# Patient Record
Sex: FEMALE | URBAN - METROPOLITAN AREA
[De-identification: names, ages, dates, MRNs, and addresses within clinical notes are randomized per-mention and may not be internally consistent; named-entity substitution may affect disease eponyms.]

---

## 2023-04-06 ENCOUNTER — OFFICE VISIT (OUTPATIENT)
Facility: LOCATION | Age: 78
End: 2023-04-06
Payer: MEDICARE

## 2023-04-06 DIAGNOSIS — H04.123 DRY EYE SYNDROME OF BILATERAL LACRIMAL GLANDS: ICD-10-CM

## 2023-04-06 DIAGNOSIS — H25.813 COMBINED FORMS OF AGE-RELATED CATARACT, BILATERAL: ICD-10-CM

## 2023-04-06 DIAGNOSIS — H40.1132 PRIMARY OPEN-ANGLE GLAUCOMA BILATERAL MODERATE STAGE: Primary | ICD-10-CM

## 2023-04-06 DIAGNOSIS — H11.052 PERIPHERAL PTERYGIUM, PROGRESSIVE, LEFT EYE: ICD-10-CM

## 2023-04-06 PROCEDURE — 99215 OFFICE O/P EST HI 40 MIN: CPT | Performed by: OPHTHALMOLOGY

## 2023-04-06 PROCEDURE — 92083 EXTENDED VISUAL FIELD XM: CPT | Performed by: OPHTHALMOLOGY

## 2023-04-06 ASSESSMENT — INTRAOCULAR PRESSURE
OD: 17
OD: 16
OS: 17
OS: 14

## 2023-04-06 NOTE — IMPRESSION/PLAN
Impression: Examination revealed cataract. Clinically significant Plan: Plan for CEIOL/OMNI/Hydrus OD then OS when patient is motivated to proceed. Target distance OU Consideration to astigmatism correction if significant on cataract testing.

## 2023-04-06 NOTE — IMPRESSION/PLAN
Impression: Nasal PTG 2.4mm into cornea Plan: Advised sun protection and artificial tears prn discomfort.

## 2023-04-06 NOTE — IMPRESSION/PLAN
Impression: Examination revealed dry eye syndrome secondary to tear deficiencies. Discussed with patient significant dry eye and PTG OS.  Plan: Increase ATs to TID OU

## 2023-04-06 NOTE — IMPRESSION/PLAN
Impression: 5 week f/u with VF and IOP check since restarting glaucoma gtts for POAG OU. POHx: POAG moderate OU, Cataract OU, AIMEE, (-) trauma. FOHx: (-) for ocular disease. PMHx: Depression. Eye medications: None. Was supposed to be on Latanoprost and Timolol OU. Good response to Latanoprost 19/20->16/14. TMAX: 24/24. Target IOP: Pending. Plan: Testing: OCT [facility] [place] 3/2023: OD thin I/S, OS thin S>I. Possible progression OU. HVF 24-2 4/2023: OU unreliable, OD SAS>IAS, OS superior altitudinal. OU unable to assess for progression. Pachy: 519/756 Gonio 3/2023: CBB, 4+ pigment 360 OD, SS, 4+ pigment 360 OS. Today:
IOP acceptable OU. Patient reports moderate compliance with medications. Patient reports will be setting alarms to better compliance. HVF performed and reviewed. Informed patient stable examination findings. Stressed importance of good medication compliance to prevent further progression to glaucoma. Plan:
Continue Timolol QAM OU. Continue Latanoprost QHS OU. RTC in 3 months with OCT/ONH OU. Next step CEIOL/OMNI/Hydrus OD then OS. Target distance OU.

## 2023-07-21 ENCOUNTER — OFFICE VISIT (OUTPATIENT)
Facility: LOCATION | Age: 78
End: 2023-07-21
Payer: MEDICARE

## 2023-07-21 DIAGNOSIS — H04.123 DRY EYE SYNDROME OF BILATERAL LACRIMAL GLANDS: ICD-10-CM

## 2023-07-21 DIAGNOSIS — H25.813 COMBINED FORMS OF AGE-RELATED CATARACT, BILATERAL: ICD-10-CM

## 2023-07-21 DIAGNOSIS — H11.052 PERIPHERAL PTERYGIUM, PROGRESSIVE, LEFT EYE: ICD-10-CM

## 2023-07-21 DIAGNOSIS — H40.1132 PRIMARY OPEN-ANGLE GLAUCOMA BILATERAL MODERATE STAGE: Primary | ICD-10-CM

## 2023-07-21 PROCEDURE — 99214 OFFICE O/P EST MOD 30 MIN: CPT | Performed by: OPHTHALMOLOGY

## 2023-07-21 PROCEDURE — 92133 CPTRZD OPH DX IMG PST SGM ON: CPT | Performed by: OPHTHALMOLOGY

## 2023-07-21 ASSESSMENT — INTRAOCULAR PRESSURE
OS: 16
OD: 16

## 2023-07-21 NOTE — IMPRESSION/PLAN
Impression: Discussed with patient significant dry eye and ptg  OS Plan: Today:
Patient c/o discomfort OS>OD. Informed patient of mild dryness and eyelid inflammation are the cause for discomfort. Recommend patient to improve dryness prior to proceeding with cataract surgery. Plan:
Start ATs QID OU. Start Hot Compresses BID OU.

## 2023-07-21 NOTE — IMPRESSION/PLAN
Impression: 3 month follow up with OCT/ONH OU. POHx: POAG moderate OU, Cataract OU, AIMEE, (-) trauma. FOHx: (-) for ocular disease. PMHx: Depression. Eye medications: None. Was supposed to be on Latanoprost and Timolol OU. Good response to Latanoprost 19/20->16/14. TMAX: 24/24. Target IOP: Pending. Plan: Testing: OCT [facility] [place] 7/[place]23: OD thin I/S, OS thin S>I. OU confirmed progression. HVF 24-2 4/2023: OU unreliable, OD SAS>IAS, OS superior altitudinal. OU unable to assess for progression. Pachy: 936/985 Gonio 3/2023: CBB, 4+ pigment 360 OD, SS, 4+ pigment 360 OS. Today:
IOP unacceptable given confirmed OCT NFL progression OU. OCT/ONH performed and reviewed. Informed patient of confirmed slow progression OU. Discussed with patient need to lower IOP. Informed patient progression due to cataract progression resulting in IOP spikes. Discussed R/B/A/Is of MIGS at time of cataract surgery to improve glaucoma care. Plan:
Continue Timolol QAM OU. Continue Latanoprost QHS OU. Recommend CEIOL/OMNI/Hydrus OD then OS. Target distance OU.

## 2023-07-21 NOTE — IMPRESSION/PLAN
Impression: Examination revealed cataract. Clinically significant Plan: Today:
Discussed R/B/A/Is of MIGS at time of cataract surgery to improve glaucoma care. Plan:
RTC in 4 weeks for cataract evaluation with testing and DFE. Plan for CEIOL/OMNI/Hydrus OD then OS when patient is motivated to proceed. Target distance OU Discussed with patient that cataract sx is not urgent but preferably in the next two months. Consideration to astigmatism correction if significant on cataract testing.

## 2023-08-18 ENCOUNTER — OFFICE VISIT (OUTPATIENT)
Facility: LOCATION | Age: 78
End: 2023-08-18
Payer: MEDICARE

## 2023-08-18 DIAGNOSIS — H04.123 DRY EYE SYNDROME OF BILATERAL LACRIMAL GLANDS: ICD-10-CM

## 2023-08-18 DIAGNOSIS — H40.1132 PRIMARY OPEN-ANGLE GLAUCOMA BILATERAL MODERATE STAGE: ICD-10-CM

## 2023-08-18 DIAGNOSIS — H11.052 PERIPHERAL PTERYGIUM, PROGRESSIVE, LEFT EYE: ICD-10-CM

## 2023-08-18 DIAGNOSIS — H25.813 COMBINED FORMS OF AGE-RELATED CATARACT, BILATERAL: Primary | ICD-10-CM

## 2023-08-18 PROCEDURE — 92134 CPTRZ OPH DX IMG PST SGM RTA: CPT | Performed by: OPHTHALMOLOGY

## 2023-08-18 PROCEDURE — 68761 CLOSE TEAR DUCT OPENING: CPT | Performed by: OPHTHALMOLOGY

## 2023-08-18 PROCEDURE — 99214 OFFICE O/P EST MOD 30 MIN: CPT | Performed by: OPHTHALMOLOGY

## 2023-08-18 PROCEDURE — 92136 OPHTHALMIC BIOMETRY: CPT | Performed by: OPHTHALMOLOGY

## 2023-08-18 ASSESSMENT — VISUAL ACUITY
OS: 20/25-2
OD: 20/30+2

## 2023-08-18 ASSESSMENT — INTRAOCULAR PRESSURE
OD: 14
OS: 12

## 2023-09-01 ENCOUNTER — OFFICE VISIT (OUTPATIENT)
Facility: LOCATION | Age: 78
End: 2023-09-01
Payer: MEDICARE

## 2023-09-01 DIAGNOSIS — H40.1132 PRIMARY OPEN-ANGLE GLAUCOMA BILATERAL MODERATE STAGE: ICD-10-CM

## 2023-09-01 DIAGNOSIS — H11.052 PERIPHERAL PTERYGIUM, PROGRESSIVE, LEFT EYE: ICD-10-CM

## 2023-09-01 DIAGNOSIS — H04.123 DRY EYE SYNDROME OF BILATERAL LACRIMAL GLANDS: ICD-10-CM

## 2023-09-01 DIAGNOSIS — H25.813 COMBINED FORMS OF AGE-RELATED CATARACT, BILATERAL: Primary | ICD-10-CM

## 2023-09-01 PROCEDURE — 99214 OFFICE O/P EST MOD 30 MIN: CPT | Performed by: OPHTHALMOLOGY

## 2023-09-01 PROCEDURE — 92136 OPHTHALMIC BIOMETRY: CPT | Performed by: OPHTHALMOLOGY

## 2023-09-01 RX ORDER — PREDNISOLONE ACETATE 10 MG/ML
1 % SUSPENSION/ DROPS OPHTHALMIC
Qty: 10 | Refills: 1 | Status: ACTIVE
Start: 2023-09-01

## 2023-09-01 RX ORDER — KETOROLAC TROMETHAMINE 5 MG/ML
0.5 % SOLUTION OPHTHALMIC
Qty: 10 | Refills: 1 | Status: ACTIVE
Start: 2023-09-01

## 2023-09-01 RX ORDER — OFLOXACIN 3 MG/ML
0.3 % SOLUTION/ DROPS OPHTHALMIC
Qty: 3 | Refills: 1 | Status: ACTIVE
Start: 2023-09-01

## 2023-09-01 ASSESSMENT — INTRAOCULAR PRESSURE
OS: 16
OD: 18

## 2023-09-17 ENCOUNTER — Encounter (OUTPATIENT)
Facility: LOCATION | Age: 78
End: 2023-09-17
Payer: MEDICARE

## 2023-09-17 PROCEDURE — 66991 XCAPSL CTRC RMVL INSJ 1+: CPT | Performed by: OPHTHALMOLOGY

## 2023-09-18 ENCOUNTER — PROCEDURE (OUTPATIENT)
Facility: LOCATION | Age: 78
End: 2023-09-18
Payer: MEDICARE

## 2023-09-19 ENCOUNTER — POST-OPERATIVE VISIT (OUTPATIENT)
Facility: LOCATION | Age: 78
End: 2023-09-19
Payer: MEDICARE

## 2023-09-19 DIAGNOSIS — Z48.810 ENCOUNTER FOR SURGICAL AFTERCARE FOLLOWING SURGERY ON A SENSE ORGAN: Primary | ICD-10-CM

## 2023-09-19 PROCEDURE — 99024 POSTOP FOLLOW-UP VISIT: CPT | Performed by: OPHTHALMOLOGY

## 2023-09-19 ASSESSMENT — INTRAOCULAR PRESSURE
OD: 14
OS: 14

## 2023-09-26 ENCOUNTER — POST-OPERATIVE VISIT (OUTPATIENT)
Facility: LOCATION | Age: 78
End: 2023-09-26
Payer: MEDICARE

## 2023-09-26 DIAGNOSIS — Z48.810 ENCOUNTER FOR SURGICAL AFTERCARE FOLLOWING SURGERY ON A SENSE ORGAN: Primary | ICD-10-CM

## 2023-09-26 DIAGNOSIS — H25.812 COMBINED FORMS OF AGE-RELATED CATARACT, LEFT EYE: ICD-10-CM

## 2023-09-26 PROCEDURE — 99024 POSTOP FOLLOW-UP VISIT: CPT | Performed by: OPHTHALMOLOGY

## 2023-09-26 ASSESSMENT — VISUAL ACUITY: OD: 20/25

## 2023-09-26 ASSESSMENT — INTRAOCULAR PRESSURE
OD: 22
OS: 21

## 2023-10-02 ENCOUNTER — PROCEDURE (OUTPATIENT)
Facility: LOCATION | Age: 78
End: 2023-10-02
Payer: MEDICARE

## 2023-10-03 ENCOUNTER — POST-OPERATIVE VISIT (OUTPATIENT)
Facility: LOCATION | Age: 78
End: 2023-10-03
Payer: MEDICARE

## 2023-10-03 DIAGNOSIS — Z96.1 PRESENCE OF INTRAOCULAR LENS: Primary | ICD-10-CM

## 2023-10-03 PROCEDURE — 99024 POSTOP FOLLOW-UP VISIT: CPT | Performed by: OPHTHALMOLOGY

## 2023-10-03 ASSESSMENT — INTRAOCULAR PRESSURE
OS: 15
OD: 14

## 2023-10-12 ENCOUNTER — POST-OPERATIVE VISIT (OUTPATIENT)
Facility: LOCATION | Age: 78
End: 2023-10-12
Payer: MEDICARE

## 2023-10-12 DIAGNOSIS — Z96.1 PRESENCE OF INTRAOCULAR LENS: Primary | ICD-10-CM

## 2023-10-12 PROCEDURE — 99024 POSTOP FOLLOW-UP VISIT: CPT | Performed by: OPHTHALMOLOGY

## 2023-10-12 ASSESSMENT — VISUAL ACUITY
OS: 20/25
OD: 20/30

## 2023-10-12 ASSESSMENT — INTRAOCULAR PRESSURE
OD: 17
OS: 16

## 2023-11-02 ENCOUNTER — POST-OPERATIVE VISIT (OUTPATIENT)
Facility: LOCATION | Age: 78
End: 2023-11-02
Payer: MEDICARE

## 2023-11-02 DIAGNOSIS — H40.1132 PRIMARY OPEN-ANGLE GLAUCOMA BILATERAL MODERATE STAGE: ICD-10-CM

## 2023-11-02 DIAGNOSIS — Z96.1 PRESENCE OF INTRAOCULAR LENS: Primary | ICD-10-CM

## 2023-11-02 PROCEDURE — 99024 POSTOP FOLLOW-UP VISIT: CPT | Performed by: OPHTHALMOLOGY

## 2023-11-02 RX ORDER — TIMOLOL MALEATE 5 MG/ML
0.5 % SOLUTION/ DROPS OPHTHALMIC
Qty: 15 | Refills: 3 | Status: INACTIVE
Start: 2023-11-02 | End: 2024-10-31

## 2023-11-02 ASSESSMENT — VISUAL ACUITY
OD: 20/20
OS: 20/25

## 2023-11-02 ASSESSMENT — INTRAOCULAR PRESSURE
OD: 16
OS: 16

## 2024-01-18 ENCOUNTER — OFFICE VISIT (OUTPATIENT)
Facility: LOCATION | Age: 79
End: 2024-01-18
Payer: MEDICARE

## 2024-01-18 DIAGNOSIS — H04.123 DRY EYE SYNDROME OF BILATERAL LACRIMAL GLANDS: ICD-10-CM

## 2024-01-18 DIAGNOSIS — H11.052 PERIPHERAL PTERYGIUM, PROGRESSIVE, LEFT EYE: ICD-10-CM

## 2024-01-18 DIAGNOSIS — H40.1132 PRIMARY OPEN-ANGLE GLAUCOMA BILATERAL MODERATE STAGE: Primary | ICD-10-CM

## 2024-01-18 PROCEDURE — 92020 GONIOSCOPY: CPT | Performed by: OPHTHALMOLOGY

## 2024-01-18 PROCEDURE — 99213 OFFICE O/P EST LOW 20 MIN: CPT | Performed by: OPHTHALMOLOGY

## 2024-01-18 ASSESSMENT — INTRAOCULAR PRESSURE
OD: 18
OS: 14
OS: 17
OD: 15

## 2024-05-16 ENCOUNTER — OFFICE VISIT (OUTPATIENT)
Facility: LOCATION | Age: 79
End: 2024-05-16
Payer: MEDICARE

## 2024-05-16 DIAGNOSIS — H04.123 DRY EYE SYNDROME OF BILATERAL LACRIMAL GLANDS: ICD-10-CM

## 2024-05-16 DIAGNOSIS — H11.052 PERIPHERAL PTERYGIUM, PROGRESSIVE, LEFT EYE: ICD-10-CM

## 2024-05-16 DIAGNOSIS — H40.1132 PRIMARY OPEN-ANGLE GLAUCOMA BILATERAL MODERATE STAGE: Primary | ICD-10-CM

## 2024-05-16 PROCEDURE — 99213 OFFICE O/P EST LOW 20 MIN: CPT | Performed by: OPHTHALMOLOGY

## 2024-05-16 RX ORDER — PREDNISOLONE ACETATE 10 MG/ML
1 % SUSPENSION/ DROPS OPHTHALMIC
Qty: 5 | Refills: 3 | Status: ACTIVE
Start: 2024-05-16

## 2024-05-16 ASSESSMENT — INTRAOCULAR PRESSURE
OD: 12
OS: 12
OD: 14
OS: 13